# Patient Record
Sex: MALE | HISPANIC OR LATINO | Employment: PART TIME | URBAN - METROPOLITAN AREA
[De-identification: names, ages, dates, MRNs, and addresses within clinical notes are randomized per-mention and may not be internally consistent; named-entity substitution may affect disease eponyms.]

---

## 2018-08-01 ENCOUNTER — OFFICE VISIT (OUTPATIENT)
Dept: CARDIOLOGY | Facility: MEDICAL CENTER | Age: 68
End: 2018-08-01
Payer: MEDICARE

## 2018-08-01 VITALS
OXYGEN SATURATION: 96 % | SYSTOLIC BLOOD PRESSURE: 108 MMHG | WEIGHT: 175 LBS | BODY MASS INDEX: 29.16 KG/M2 | HEIGHT: 65 IN | HEART RATE: 64 BPM | DIASTOLIC BLOOD PRESSURE: 62 MMHG

## 2018-08-01 DIAGNOSIS — I25.84 CORONARY ARTERY CALCIFICATION OF NATIVE ARTERY: ICD-10-CM

## 2018-08-01 DIAGNOSIS — I25.10 CORONARY ARTERY CALCIFICATION OF NATIVE ARTERY: ICD-10-CM

## 2018-08-01 DIAGNOSIS — E78.01 FAMILIAL HYPERCHOLESTEROLEMIA: ICD-10-CM

## 2018-08-01 PROCEDURE — 99214 OFFICE O/P EST MOD 30 MIN: CPT | Performed by: INTERNAL MEDICINE

## 2018-08-01 RX ORDER — LOVASTATIN 40 MG/1
40 TABLET ORAL NIGHTLY
COMMUNITY
End: 2024-02-13

## 2018-08-01 RX ORDER — CHLORAL HYDRATE 500 MG
1000 CAPSULE ORAL
COMMUNITY

## 2018-08-01 RX ORDER — LEVOTHYROXINE SODIUM 75 MCG
TABLET ORAL
COMMUNITY
Start: 2018-07-16

## 2018-08-01 RX ORDER — LORATADINE 10 MG/1
10 TABLET ORAL DAILY
COMMUNITY

## 2018-08-01 ASSESSMENT — ENCOUNTER SYMPTOMS
PND: 0
MYALGIAS: 0
DIZZINESS: 0
PALPITATIONS: 0
ABDOMINAL PAIN: 0
DEPRESSION: 0
INSOMNIA: 0
HEARTBURN: 0
NERVOUS/ANXIOUS: 0
LOSS OF CONSCIOUSNESS: 0
BRUISES/BLEEDS EASILY: 0
WEIGHT LOSS: 0
COUGH: 0
WHEEZING: 0
BACK PAIN: 0
SHORTNESS OF BREATH: 0
NAUSEA: 0
EYES NEGATIVE: 1

## 2018-08-01 NOTE — PROGRESS NOTES
"Chief Complaint   Patient presents with   • Hyperlipidemia       Subjective:   Nestor Borja Jr. is a 67 y.o. male who presents today in follow-up in regards to his coronary calcification/CAD diagnosed several years ago by Dr. Jacobs as well as hyperlipidemia and very strong family history of coronary disease at a young age and his father    Reestablishing care, friend diagnosed with coronary disease and had urgent CABG.  Worries about this with his upcoming 68th birthday  Less and less walking and golf because of knee arthritis, wonders if he still has exercise capacity or heart disease    Wife getting treated for lung cancer, weekly visits to get Solomon  Medications as directed no labs since 2 years ago when he had HCV treatment    Past Medical History:   Diagnosis Date   • Anesthesia     'sensitive'   • Arthritis     in his  knees   • Cold     11/28   • Diabetes (HCC)     \"borderline\"   • Hepatitis C    • High cholesterol    • Renal disorder 1972    left [id number]   • Unspecified disorder of thyroid     takes med     Past Surgical History:   Procedure Laterality Date   • KNEE ARTHROPLASTY TOTAL Left 12/14/2015    Procedure: KNEE ARTHROPLASTY TOTAL & STAPLE REMOVAL;  Surgeon: Rose Mary Mclaughlin M.D.;  Location: SURGERY [facility];  Service:    • KNEE ARTHROPLASTY TOTAL  1/11/2011    Performed by ROSE MARY MCLAUGHLIN at SURGERY [facility]   • SEPTOTURBINOPLASTY  1/14/2010    Performed by MARLENE ROMAN at SURGERY SAME DAY [facility]S   • CLARKE BY LAPAROSCOPY  10/22/2009    Performed by LUBA APARICIO at SURGERY SAME DAY [facility] ORS   • OTHER  09/2009     liver biopsy   • NEPHRECTOMY RADICAL  1972    left   • OTHER ORTHOPEDIC SURGERY  [id number]    2 left knee and 3 right knee     Family History   Problem Relation Age of Onset   • Stroke Unknown    • Cancer Unknown      Social History     Social History   • Marital status:      Spouse name: N/A   • Number of children: N/A   • Years " of education: N/A     Occupational History   • Not on file.     Social History Main Topics   • Smoking status: Never Smoker   • Smokeless tobacco: Never Used   • Alcohol use No   • Drug use: No   • Sexual activity: Not on file     Other Topics Concern   • Not on file     Social History Narrative   • No narrative on file     No Known Allergies  Outpatient Encounter Prescriptions as of 8/1/2018   Medication Sig Dispense Refill   • lovastatin (MEVACOR) 40 MG tablet Take 40 mg by mouth every evening.     • SYNTHROID 75 MCG Tab      • loratadine (CLARITIN) 10 MG Tab Take 10 mg by mouth every day.     • Omega-3 Fatty Acids (FISH OIL) 1000 MG Cap capsule Take 1,000 mg by mouth 3 times a day, with meals.     • Cholecalciferol (VITAMIN D PO) Take 2,000 Units by mouth.     • aspirin (ASA) 81 MG Chew Tab chewable tablet Take 81 mg by mouth every day.     • [DISCONTINUED] metformin (GLUCOPHAGE) 500 MG Tab Take 500 mg by mouth 2 times a day, with meals.     • [DISCONTINUED] lovastatin (MEVACOR) 20 MG Tab Take 20 mg by mouth every evening.     • [DISCONTINUED] multivitamin (THERAGRAN) Tab Take 1 Tab by mouth every day.     • [DISCONTINUED] Ascorbic Acid (VITAMIN C) 1000 MG Tab Take  by mouth.     • [DISCONTINUED] LEVOTHYROXINE SODIUM Take 0.05 mg by mouth every day.       No facility-administered encounter medications on file as of 8/1/2018.      Review of Systems   Constitutional: Negative for malaise/fatigue and weight loss.   HENT: Negative for congestion and hearing loss.    Eyes: Negative.    Respiratory: Negative for cough, shortness of breath and wheezing.    Cardiovascular: Negative for chest pain, palpitations, leg swelling and PND.   Gastrointestinal: Negative for abdominal pain, heartburn and nausea.   Musculoskeletal: Negative for back pain and myalgias.   Skin: Negative for rash.   Neurological: Negative for dizziness and loss of consciousness.   Endo/Heme/Allergies: Does not bruise/bleed easily.  "  Psychiatric/Behavioral: Negative for depression. The patient is not nervous/anxious and does not have insomnia.    All other systems reviewed and are negative.       Objective:   /62   Pulse 64   Ht 1.651 m (5' 5\")   Wt 79.4 kg (175 lb)   SpO2 96%   BMI 29.12 kg/m²     Physical Exam   Constitutional: He is oriented to person, place, and time.   Well-appearing healthy   HENT:   Head: Normocephalic and atraumatic.   Eyes: Pupils are equal, round, and reactive to light. No scleral icterus.   Neck: No JVD present. No thyromegaly present.   Cardiovascular: Normal rate and regular rhythm.  Exam reveals no friction rub.    No murmur heard.  No bruits normal carotid upstrokes normal dorsalis pedis pulses bilaterally and radial pulses   Pulmonary/Chest: Breath sounds normal. No respiratory distress. He exhibits no tenderness.   Abdominal: Bowel sounds are normal. He exhibits no distension.   Musculoskeletal: He exhibits no edema or tenderness.   Lymphadenopathy:     He has no cervical adenopathy.   Neurological: He is alert and oriented to person, place, and time. He exhibits normal muscle tone. Coordination normal.   Skin: Skin is warm and dry.   Psychiatric: He has a normal mood and affect. His behavior is normal.       Assessment:     1. Familial hypercholesterolemia     2. Coronary artery calcification of native artery  Echo-Rest/Stress w/o Contrast    LIPID PROFILE    COMP METABOLIC PANEL       Medical Decision Making:  Today's Assessment / Status / Plan:       Entire chart reviewed we reviewed his last labs which are in 2015    Coronary calcification/family history coronary disease  Reduced exercise capacity  Maximal stress echo discussed and ordered.  Talked about the possibility of needing an angiogram if significant abnormalities.  Continue baby aspirin and statin    Hyperlipidemia  As above, definitely needs blood work done including lipids and LFTs  Talked about 10 year risks and goals    Spent more " than 35 minutes discussing this  RTC based on testing or in 1 year

## 2018-08-21 ENCOUNTER — HOSPITAL ENCOUNTER (OUTPATIENT)
Dept: CARDIOLOGY | Facility: MEDICAL CENTER | Age: 68
End: 2018-08-21
Attending: INTERNAL MEDICINE
Payer: MEDICARE

## 2018-08-21 DIAGNOSIS — I25.84 CORONARY ARTERY CALCIFICATION OF NATIVE ARTERY: ICD-10-CM

## 2018-08-21 DIAGNOSIS — I25.10 CORONARY ARTERY CALCIFICATION OF NATIVE ARTERY: ICD-10-CM

## 2018-08-21 LAB — LV EJECT FRACT  99904: 70

## 2018-08-21 PROCEDURE — 93350 STRESS TTE ONLY: CPT

## 2018-08-21 PROCEDURE — 93017 CV STRESS TEST TRACING ONLY: CPT

## 2018-08-21 PROCEDURE — 93350 STRESS TTE ONLY: CPT | Mod: 26 | Performed by: INTERNAL MEDICINE

## 2018-08-21 PROCEDURE — 93018 CV STRESS TEST I&R ONLY: CPT | Performed by: INTERNAL MEDICINE

## 2018-08-22 ENCOUNTER — TELEPHONE (OUTPATIENT)
Dept: CARDIOLOGY | Facility: MEDICAL CENTER | Age: 68
End: 2018-08-22

## 2018-08-22 NOTE — TELEPHONE ENCOUNTER
Notes recorded by Fariba Cortes M.D. on 8/21/2018 at 9:15 PM PDT  Great news - normal and strong heart on stress  F/u as planned  ===================================================  Letter sent to pt with above echo results.

## 2018-08-22 NOTE — LETTER
August 22, 2018        Nestor Borja Jr.  [address] [address]          Dear Nestor,    We have received the results of your recent:    Stress echocardiogram     Your test came back within normal limits. Dr. Cortes says your heart is normal and strong.  Please follow up as previously discussed with your physician.      Feel free to call us with any questions.        Sincerely,          SERVANDO Araujo.

## 2019-07-05 ENCOUNTER — OFFICE VISIT (OUTPATIENT)
Dept: URGENT CARE | Facility: MEDICAL CENTER | Age: 69
End: 2019-07-05
Payer: MEDICARE

## 2019-07-05 VITALS
DIASTOLIC BLOOD PRESSURE: 74 MMHG | HEART RATE: 62 BPM | SYSTOLIC BLOOD PRESSURE: 166 MMHG | BODY MASS INDEX: 29.92 KG/M2 | HEIGHT: 65 IN | TEMPERATURE: 97.1 F | OXYGEN SATURATION: 93 % | WEIGHT: 179.6 LBS

## 2019-07-05 DIAGNOSIS — L03.113 RIGHT ARM CELLULITIS: ICD-10-CM

## 2019-07-05 DIAGNOSIS — M70.21 OLECRANON BURSITIS, RIGHT ELBOW: ICD-10-CM

## 2019-07-05 PROCEDURE — 99204 OFFICE O/P NEW MOD 45 MIN: CPT | Performed by: PHYSICIAN ASSISTANT

## 2019-07-05 RX ORDER — CEPHALEXIN 500 MG/1
500 CAPSULE ORAL 4 TIMES DAILY
Qty: 28 CAP | Refills: 0 | Status: SHIPPED | OUTPATIENT
Start: 2019-07-05 | End: 2019-07-05 | Stop reason: SDUPTHER

## 2019-07-05 RX ORDER — CEPHALEXIN 500 MG/1
500 CAPSULE ORAL 4 TIMES DAILY
Qty: 28 CAP | Refills: 0 | Status: SHIPPED | OUTPATIENT
Start: 2019-07-05 | End: 2019-07-12

## 2019-07-05 RX ORDER — SULFAMETHOXAZOLE AND TRIMETHOPRIM 800; 160 MG/1; MG/1
1 TABLET ORAL 2 TIMES DAILY
Qty: 14 TAB | Refills: 0 | Status: SHIPPED | OUTPATIENT
Start: 2019-07-05 | End: 2019-07-12

## 2019-07-05 ASSESSMENT — ENCOUNTER SYMPTOMS
FOCAL WEAKNESS: 0
FEVER: 0
CHILLS: 0
NAUSEA: 0
ABDOMINAL PAIN: 0
VOMITING: 0
ROS SKIN COMMENTS: RIGHT ARM SWELLING
PALPITATIONS: 0
COUGH: 0
SENSORY CHANGE: 0
SHORTNESS OF BREATH: 0
TINGLING: 0

## 2019-07-05 NOTE — PROGRESS NOTES
"Subjective:      Nestor Borja Jr. is a 68 y.o. male who presents with Arm Injury (redness and seliing on rtside from elbow to bottom of arm, no pain Xtoday)            Patient is here with complaints of right arm swelling x a few days. He states his swelling started several days ago in his right elbow area. He denies any injury. He reports a fluid filled sac on his right elbow. He reports over the past 2 days the swelling has extended down his right arm. He denies pain but reports warmth and redness. He denies a recent bug bite or skin injury. He has had no fever or chills. He denies recent venipuncture.      Past Medical History:   Diagnosis Date   • Anesthesia     'sensitive'   • Arthritis     in his  knees   • Cold     11/28   • Diabetes (HCC)     \"borderline\"   • Hepatitis C    • High cholesterol    • Renal disorder 1972    left [id number]   • Unspecified disorder of thyroid     takes med       Past Surgical History:   Procedure Laterality Date   • KNEE ARTHROPLASTY TOTAL Left 12/14/2015    Procedure: KNEE ARTHROPLASTY TOTAL & STAPLE REMOVAL;  Surgeon: Rose Mary Mclaughlin M.D.;  Location: SURGERY [facility];  Service:    • KNEE ARTHROPLASTY TOTAL  1/11/2011    Performed by ROSE MARY MCLAUGHLIN at SURGERY [facility]   • SEPTOTURBINOPLASTY  1/14/2010    Performed by MARLENE ROMAN at SURGERY SAME DAY [facility]   • CLARKE BY LAPAROSCOPY  10/22/2009    Performed by LUBA APARICIO at SURGERY SAME DAY [facility]   • OTHER  09/2009     liver biopsy   • NEPHRECTOMY RADICAL  1972    left   • OTHER ORTHOPEDIC SURGERY  [id number]    2 left knee and 3 right knee       Family History   Problem Relation Age of Onset   • Stroke Unknown    • Cancer Unknown        No Known Allergies     Review of Systems   Constitutional: Negative for chills, fever and malaise/fatigue.   Respiratory: Negative for cough and shortness of breath.    Cardiovascular: Negative for chest pain, palpitations and leg swelling.   " Is This A New Presentation, Or A Follow-Up?: Skin Lesion "  Gastrointestinal: Negative for abdominal pain, nausea and vomiting.   Musculoskeletal: Negative for joint pain.   Skin: Negative for itching and rash.        Right arm swelling   Neurological: Negative for tingling, sensory change and focal weakness.       All other systems reviewed and are negative.        Objective:     BP (!) 166/74   Pulse 62   Temp 36.2 °C (97.1 °F)   Ht 1.651 m (5' 5\")   Wt 81.5 kg (179 lb 9.6 oz)   SpO2 93%   BMI 29.89 kg/m²      Physical Exam   Constitutional: He is oriented to person, place, and time. He appears well-developed and well-nourished. No distress.   HENT:   Head: Normocephalic and atraumatic.   Eyes: Conjunctivae are normal.   Cardiovascular: Normal rate, regular rhythm and normal heart sounds.  Exam reveals no gallop and no friction rub.    No murmur heard.  Pulmonary/Chest: Effort normal. No respiratory distress. He has no wheezes. He has no rales.   Musculoskeletal:        Right elbow: He exhibits swelling (olecranon bursa with swelling and erythema. Warm). He exhibits normal range of motion and no deformity. No tenderness found.        Arms:  Neurological: He is alert and oriented to person, place, and time.   Psychiatric: He has a normal mood and affect. His behavior is normal. Judgment and thought content normal.               Assessment/Plan:     1. Right arm cellulitis    2. Olecranon bursitis, right elbow    - sulfamethoxazole-trimethoprim (BACTRIM DS) 800-160 MG tablet; Take 1 Tab by mouth 2 times a day for 7 days.  Dispense: 14 Tab; Refill: 0  - cephALEXin (KEFLEX) 500 MG Cap; Take 1 Cap by mouth 4 times a day for 7 days.  Dispense: 28 Cap; Refill: 0    Encouraged ICE, OTC NSAIDS  Probiotics with antibiotics.   If fever, chills, or worsening redness- go to ED for re-evaluation.     Differential diagnoses, Supportive care, and indications for immediate follow-up discussed with patient.   Instructed to return to clinic or nearest emergency department for any " How Severe Is Your Skin Lesion?: mild Has Your Skin Lesion Been Treated?: not been treated change in condition, further concerns, or worsening of symptoms.      The patient demonstrated a good understanding and agreed with the treatment plan.    Libra Bradford P.A.-C.

## 2020-08-07 ENCOUNTER — TELEPHONE (OUTPATIENT)
Dept: CARDIOLOGY | Facility: MEDICAL CENTER | Age: 70
End: 2020-08-07

## 2020-08-07 DIAGNOSIS — I25.10 CORONARY ARTERY CALCIFICATION OF NATIVE ARTERY: ICD-10-CM

## 2020-08-07 DIAGNOSIS — I25.84 CORONARY ARTERY CALCIFICATION OF NATIVE ARTERY: ICD-10-CM

## 2020-08-07 DIAGNOSIS — E78.01 FAMILIAL HYPERCHOLESTEROLEMIA: ICD-10-CM

## 2021-01-15 DIAGNOSIS — Z23 NEED FOR VACCINATION: ICD-10-CM

## 2022-12-28 ENCOUNTER — OFFICE VISIT (OUTPATIENT)
Dept: URGENT CARE | Facility: CLINIC | Age: 72
End: 2022-12-28
Payer: MEDICARE

## 2022-12-28 VITALS
TEMPERATURE: 97.2 F | RESPIRATION RATE: 18 BRPM | OXYGEN SATURATION: 97 % | HEIGHT: 65 IN | WEIGHT: 175 LBS | SYSTOLIC BLOOD PRESSURE: 120 MMHG | DIASTOLIC BLOOD PRESSURE: 60 MMHG | BODY MASS INDEX: 29.16 KG/M2 | HEART RATE: 68 BPM

## 2022-12-28 DIAGNOSIS — J02.0 PHARYNGITIS DUE TO STREPTOCOCCUS SPECIES: ICD-10-CM

## 2022-12-28 LAB
INT CON NEG: ABNORMAL
INT CON POS: ABNORMAL
S PYO AG THROAT QL: POSITIVE

## 2022-12-28 PROCEDURE — 99203 OFFICE O/P NEW LOW 30 MIN: CPT | Performed by: NURSE PRACTITIONER

## 2022-12-28 PROCEDURE — 87880 STREP A ASSAY W/OPTIC: CPT | Performed by: NURSE PRACTITIONER

## 2022-12-28 RX ORDER — AMOXICILLIN 500 MG/1
500 CAPSULE ORAL 2 TIMES DAILY
Qty: 20 CAPSULE | Refills: 0 | Status: SHIPPED | OUTPATIENT
Start: 2022-12-28 | End: 2023-01-07

## 2022-12-28 NOTE — PROGRESS NOTES
"Patient has consented to treatment and for use of patient information for treatment and billing purposes.    Date: 12/28/22     Arrival Mode: Private Vehicle / Ambulatory    Chief Complaint:    Chief Complaint   Patient presents with    Sore Throat     Since 12/26        History of Present Illness: 72 y.o. male  presents to clinic with 2-day history of sore throat.  Patient has also had increased fatigue.  Very mild nasal congestion.  Denies cough nausea vomiting diarrhea.  No shortness of breath chest pain or leg swelling.      ROS:  As stated in HPI     Pertinent Medical History:    Past Medical History:   Diagnosis Date    Anesthesia     'sensitive'    Arthritis     in his  knees    Cold     11/28    Diabetes (HCC)     \"borderline\"    Hepatitis C     High cholesterol     Renal disorder 1972    left [id number]    Unspecified disorder of thyroid     takes med        Pertinent Surgical History:    Past Surgical History:   Procedure Laterality Date    KNEE ARTHROPLASTY TOTAL Left 12/14/2015    Procedure: KNEE ARTHROPLASTY TOTAL & STAPLE REMOVAL;  Surgeon: Rose Mary Mclaughlin M.D.;  Location: SURGERY [facility];  Service:     KNEE ARTHROPLASTY TOTAL  1/11/2011    Performed by ROSE MARY MCLAUGHLIN at SURGERY [facility]    SEPTOTURBINOPLASTY  1/14/2010    Performed by MARLENE ROMAN at SURGERY SAME DAY [facility]    CLARKE BY LAPAROSCOPY  10/22/2009    Performed by LUBA APARICIO at SURGERY SAME DAY [facility]S    OTHER  09/2009     liver biopsy    NEPHRECTOMY RADICAL  1972    left    OTHER ORTHOPEDIC SURGERY  [id number]    2 left knee and 3 right knee        Current  Medications:  Current Outpatient Medications on File Prior to Visit   Medication Sig Dispense Refill    lovastatin (MEVACOR) 40 MG tablet Take 40 mg by mouth every evening.      SYNTHROID 75 MCG Tab       loratadine (CLARITIN) 10 MG Tab Take 10 mg by mouth every day.      Omega-3 Fatty Acids (FISH OIL) 1000 MG Cap capsule Take 1,000 mg by mouth " 3 times a day, with meals.      Cholecalciferol (VITAMIN D PO) Take 2,000 Units by mouth.      aspirin (ASA) 81 MG Chew Tab chewable tablet Take 81 mg by mouth every day.       No current facility-administered medications on file prior to visit.        Allergies:     Patient has no known allergies.     Social History:   Social History     Socioeconomic History    Marital status:      Spouse name: Not on file    Number of children: Not on file    Years of education: Not on file    Highest education level: Not on file   Occupational History    Not on file   Tobacco Use    Smoking status: Never    Smokeless tobacco: Never   Substance and Sexual Activity    Alcohol use: No    Drug use: No    Sexual activity: Not on file   Other Topics Concern    Not on file   Social History Narrative    Not on file     Social Determinants of Health     Financial Resource Strain: Not on file   Food Insecurity: Not on file   Transportation Needs: Not on file   Physical Activity: Not on file   Stress: Not on file   Social Connections: Not on file   Intimate Partner Violence: Not on file   Housing Stability: Not on file        No LMP for male patient.       Physical Exam:  Vitals:    12/28/22 0838   BP: 120/60   Pulse: 68   Resp: 18   Temp: 36.2 °C (97.2 °F)   SpO2: 97%        Physical Exam  Constitutional:       General: He is awake.      Appearance: Normal appearance. He is not ill-appearing, toxic-appearing or diaphoretic.   HENT:      Head: Normocephalic and atraumatic.      Right Ear: Tympanic membrane, ear canal and external ear normal.      Left Ear: Tympanic membrane, ear canal and external ear normal.      Nose: Rhinorrhea present. Rhinorrhea is clear.      Mouth/Throat:      Lips: Pink.      Mouth: Mucous membranes are moist.      Tongue: No lesions.      Palate: No lesions.      Pharynx: Posterior oropharyngeal erythema present. No oropharyngeal exudate or uvula swelling.      Tonsils: Tonsillar exudate present. No  tonsillar abscesses. 3+ on the right. 2+ on the left.   Eyes:      General: Lids are normal. Gaze aligned appropriately. No allergic shiner or scleral icterus.     Extraocular Movements: Extraocular movements intact.      Conjunctiva/sclera: Conjunctivae normal.      Pupils: Pupils are equal, round, and reactive to light.   Cardiovascular:      Rate and Rhythm: Normal rate and regular rhythm.      Pulses:           Radial pulses are 2+ on the right side and 2+ on the left side.      Heart sounds: Normal heart sounds.   Pulmonary:      Effort: Pulmonary effort is normal.      Breath sounds: Normal breath sounds and air entry. No decreased breath sounds, wheezing, rhonchi or rales.   Abdominal:      General: Abdomen is flat. Bowel sounds are normal.      Palpations: Abdomen is soft.      Tenderness: There is no abdominal tenderness.   Musculoskeletal:      Right lower leg: No edema.      Left lower leg: No edema.   Lymphadenopathy:      Cervical: Cervical adenopathy present.      Right cervical: Superficial cervical adenopathy present.      Left cervical: Superficial cervical adenopathy present.   Skin:     General: Skin is warm.      Capillary Refill: Capillary refill takes less than 2 seconds.      Coloration: Skin is not cyanotic or pale.   Neurological:      Mental Status: He is alert and oriented to person, place, and time.      Gait: Gait is intact.   Psychiatric:         Behavior: Behavior normal. Behavior is cooperative.        Diagnostics:    Poct strep- positive    Medical Decision Making:  I personally reviewed prior external notes and test results pertinent to today's visit.   Shared decision-making was utilized with patient did develop treatment plan and clinic course. Jose, 72 y.o. male presenting clinic on the morning of day 3 of sore throat.  HPI and exam findings are consistent with strep pharyngitis.  Did obtain a POCT strep of which the results were positive.  Will treat with amoxicillin 10-day  course.  Did advise to finish antibiotics in their entirety even when he is feeling better.  Recommend changing toothbrush on day 3 of antibiotics.    Did advise patient on conservative measures for management of symptoms.  Patient is agreeable to pursue adequate rest, adequate hydration, saltwater gargle and Neti pot for any symptoms of upper respiratory congestion.  Over-the-counter analgesia and antipyretics on a p.r.n. basis as needed for pain and fever.  Did discuss over-the-counter cough medications.      Patient will monitor symptoms closely for worsening and is advised to seek further evaluation the emergency room if alarm signs or symptoms arise.  Patient states understanding and verbalizes agreement with this plan of care.    Plan:    1. Pharyngitis due to Streptococcus species    - amoxicillin (AMOXIL) 500 MG Cap; Take 1 Capsule by mouth 2 times a day for 10 days.  Dispense: 20 Capsule; Refill: 0           Disposition:  Patient was discharged in stable condition.    Voice Recognition Disclaimer: Portions of this document were created using voice recognition software. The software does have a chance of producing errors of grammar and possibly content. I have made every reasonable attempt to correct obvious errors, but there may be errors of grammar and possibly content that I did not discover before finalizing the documentation.    Libra Johnson, A.P.R.N.

## 2023-02-16 ENCOUNTER — HOSPITAL ENCOUNTER (OUTPATIENT)
Dept: LAB | Facility: MEDICAL CENTER | Age: 73
End: 2023-02-16
Attending: INTERNAL MEDICINE
Payer: MEDICARE

## 2023-02-16 LAB
ALBUMIN SERPL BCP-MCNC: 3.9 G/DL (ref 3.2–4.9)
ALBUMIN/GLOB SERPL: 1.2 G/DL
ALP SERPL-CCNC: 68 U/L (ref 30–99)
ALT SERPL-CCNC: 25 U/L (ref 2–50)
ANION GAP SERPL CALC-SCNC: 8 MMOL/L (ref 7–16)
AST SERPL-CCNC: 24 U/L (ref 12–45)
BILIRUB SERPL-MCNC: 0.6 MG/DL (ref 0.1–1.5)
BUN SERPL-MCNC: 21 MG/DL (ref 8–22)
CALCIUM ALBUM COR SERPL-MCNC: 9.1 MG/DL (ref 8.5–10.5)
CALCIUM SERPL-MCNC: 9 MG/DL (ref 8.4–10.2)
CHLORIDE SERPL-SCNC: 104 MMOL/L (ref 96–112)
CHOLEST SERPL-MCNC: 117 MG/DL (ref 100–199)
CO2 SERPL-SCNC: 26 MMOL/L (ref 20–33)
CREAT SERPL-MCNC: 1.26 MG/DL (ref 0.5–1.4)
ERYTHROCYTE [DISTWIDTH] IN BLOOD BY AUTOMATED COUNT: 41.2 FL (ref 35.9–50)
FASTING STATUS PATIENT QL REPORTED: NORMAL
GFR SERPLBLD CREATININE-BSD FMLA CKD-EPI: 60 ML/MIN/1.73 M 2
GLOBULIN SER CALC-MCNC: 3.3 G/DL (ref 1.9–3.5)
GLUCOSE SERPL-MCNC: 109 MG/DL (ref 65–99)
HCT VFR BLD AUTO: 45.5 % (ref 42–52)
HDLC SERPL-MCNC: 33 MG/DL
HGB BLD-MCNC: 15.6 G/DL (ref 14–18)
LDLC SERPL CALC-MCNC: 53 MG/DL
MCH RBC QN AUTO: 32 PG (ref 27–33)
MCHC RBC AUTO-ENTMCNC: 34.3 G/DL (ref 33.7–35.3)
MCV RBC AUTO: 93.4 FL (ref 81.4–97.8)
PLATELET # BLD AUTO: 197 K/UL (ref 164–446)
PMV BLD AUTO: 9.5 FL (ref 9–12.9)
POTASSIUM SERPL-SCNC: 4.3 MMOL/L (ref 3.6–5.5)
PROT SERPL-MCNC: 7.2 G/DL (ref 6–8.2)
RBC # BLD AUTO: 4.87 M/UL (ref 4.7–6.1)
SODIUM SERPL-SCNC: 138 MMOL/L (ref 135–145)
TRIGL SERPL-MCNC: 153 MG/DL (ref 0–149)
WBC # BLD AUTO: 6.2 K/UL (ref 4.8–10.8)

## 2023-02-16 PROCEDURE — 85027 COMPLETE CBC AUTOMATED: CPT

## 2023-02-16 PROCEDURE — 80053 COMPREHEN METABOLIC PANEL: CPT

## 2023-02-16 PROCEDURE — 80061 LIPID PANEL: CPT

## 2023-02-16 PROCEDURE — 36415 COLL VENOUS BLD VENIPUNCTURE: CPT

## 2023-11-29 ENCOUNTER — PATIENT MESSAGE (OUTPATIENT)
Dept: HEALTH INFORMATION MANAGEMENT | Facility: OTHER | Age: 73
End: 2023-11-29

## 2023-12-07 ENCOUNTER — APPOINTMENT (RX ONLY)
Dept: URBAN - METROPOLITAN AREA CLINIC 4 | Facility: CLINIC | Age: 73
Setting detail: DERMATOLOGY
End: 2023-12-07

## 2023-12-07 DIAGNOSIS — D18.0 HEMANGIOMA: ICD-10-CM

## 2023-12-07 DIAGNOSIS — D22 MELANOCYTIC NEVI: ICD-10-CM

## 2023-12-07 DIAGNOSIS — L81.4 OTHER MELANIN HYPERPIGMENTATION: ICD-10-CM

## 2023-12-07 DIAGNOSIS — Z71.89 OTHER SPECIFIED COUNSELING: ICD-10-CM

## 2023-12-07 DIAGNOSIS — L82.1 OTHER SEBORRHEIC KERATOSIS: ICD-10-CM

## 2023-12-07 PROBLEM — D18.01 HEMANGIOMA OF SKIN AND SUBCUTANEOUS TISSUE: Status: ACTIVE | Noted: 2023-12-07

## 2023-12-07 PROBLEM — D22.5 MELANOCYTIC NEVI OF TRUNK: Status: ACTIVE | Noted: 2023-12-07

## 2023-12-07 PROCEDURE — ? COUNSELING

## 2023-12-07 PROCEDURE — 99203 OFFICE O/P NEW LOW 30 MIN: CPT

## 2023-12-07 PROCEDURE — ? SUNSCREEN RECOMMENDATIONS

## 2023-12-07 ASSESSMENT — LOCATION DETAILED DESCRIPTION DERM
LOCATION DETAILED: INFERIOR THORACIC SPINE
LOCATION DETAILED: LEFT POSTERIOR SHOULDER
LOCATION DETAILED: SUPERIOR LUMBAR SPINE
LOCATION DETAILED: EPIGASTRIC SKIN
LOCATION DETAILED: RIGHT POSTERIOR SHOULDER

## 2023-12-07 ASSESSMENT — LOCATION SIMPLE DESCRIPTION DERM
LOCATION SIMPLE: RIGHT SHOULDER
LOCATION SIMPLE: LOWER BACK
LOCATION SIMPLE: UPPER BACK
LOCATION SIMPLE: ABDOMEN
LOCATION SIMPLE: LEFT SHOULDER

## 2023-12-07 ASSESSMENT — LOCATION ZONE DERM
LOCATION ZONE: TRUNK
LOCATION ZONE: ARM

## 2023-12-07 NOTE — HPI: FULL BODY SKIN EXAMINATION
How Severe Are Your Spot(S)?: mild
What Type Of Note Output Would You Prefer (Optional)?: Standard Output
What Is The Reason For Today's Visit?: Full Body Skin Examination
What Is The Reason For Today's Visit? (Being Monitored For X): concerning skin lesions on an annual basis
Additional History: Pt has not noticed any new or changing moles. No tender or bleeding spots.
No

## 2024-02-13 ENCOUNTER — OFFICE VISIT (OUTPATIENT)
Dept: CARDIOLOGY | Facility: MEDICAL CENTER | Age: 74
End: 2024-02-13
Attending: INTERNAL MEDICINE
Payer: MEDICARE

## 2024-02-13 VITALS
WEIGHT: 174.8 LBS | BODY MASS INDEX: 29.12 KG/M2 | HEART RATE: 68 BPM | HEIGHT: 65 IN | DIASTOLIC BLOOD PRESSURE: 58 MMHG | RESPIRATION RATE: 14 BRPM | OXYGEN SATURATION: 94 % | SYSTOLIC BLOOD PRESSURE: 116 MMHG

## 2024-02-13 DIAGNOSIS — R01.1 UNDIAGNOSED CARDIAC MURMURS: ICD-10-CM

## 2024-02-13 DIAGNOSIS — E78.01 FAMILIAL HYPERCHOLESTEROLEMIA: ICD-10-CM

## 2024-02-13 PROCEDURE — 3078F DIAST BP <80 MM HG: CPT | Performed by: INTERNAL MEDICINE

## 2024-02-13 PROCEDURE — 99212 OFFICE O/P EST SF 10 MIN: CPT | Performed by: INTERNAL MEDICINE

## 2024-02-13 PROCEDURE — 3074F SYST BP LT 130 MM HG: CPT | Performed by: INTERNAL MEDICINE

## 2024-02-13 PROCEDURE — 99204 OFFICE O/P NEW MOD 45 MIN: CPT | Performed by: INTERNAL MEDICINE

## 2024-02-13 PROCEDURE — 93005 ELECTROCARDIOGRAM TRACING: CPT | Performed by: INTERNAL MEDICINE

## 2024-02-13 RX ORDER — ROSUVASTATIN CALCIUM 20 MG/1
20 TABLET, COATED ORAL DAILY
Qty: 100 TABLET | Refills: 4 | Status: SHIPPED | OUTPATIENT
Start: 2024-02-13

## 2024-02-13 RX ORDER — CARBOXYMETHYLCELLULOSE SODIUM 5 MG/ML
SOLUTION/ DROPS OPHTHALMIC
COMMUNITY
Start: 2023-10-26 | End: 2024-10-24

## 2024-02-13 RX ORDER — ROSUVASTATIN CALCIUM 20 MG/1
TABLET, COATED ORAL
COMMUNITY
End: 2024-02-13 | Stop reason: SDUPTHER

## 2024-02-13 ASSESSMENT — ENCOUNTER SYMPTOMS
COUGH: 0
HEADACHES: 0
DEPRESSION: 0
PALPITATIONS: 0
DIZZINESS: 0
HALLUCINATIONS: 0
SPEECH CHANGE: 0
ORTHOPNEA: 0
ABDOMINAL PAIN: 0
SHORTNESS OF BREATH: 0
WEIGHT LOSS: 0
EYE DISCHARGE: 0
PND: 0
BLOOD IN STOOL: 0
MYALGIAS: 0
DOUBLE VISION: 0
BLURRED VISION: 0
CHILLS: 0
SENSORY CHANGE: 0
VOMITING: 0
FALLS: 0
FEVER: 0
NAUSEA: 0
LOSS OF CONSCIOUSNESS: 0
EYE PAIN: 0
BRUISES/BLEEDS EASILY: 0
CLAUDICATION: 0

## 2024-02-13 ASSESSMENT — FIBROSIS 4 INDEX: FIB4 SCORE: 4.21

## 2024-02-14 LAB — EKG IMPRESSION: NORMAL

## 2024-02-14 PROCEDURE — 93010 ELECTROCARDIOGRAM REPORT: CPT | Performed by: INTERNAL MEDICINE

## 2024-02-14 NOTE — PROGRESS NOTES
"Chief Complaint   Patient presents with    New Patient     Est Care       Subjective     Nestor Borja Jr. is a 73 y.o. male who presents today for management of hyperlipidemia.    I have personally interpreted EKG today with patient, there is no evidence of acute coronary syndrome, no evidence of prior infarct, normal NM and QT interval, no significant conduction disease. Sinus rhythm.    Strong family hx of heart disease on his father's side.    I have independently interpreted and reviewed blood tests results with patient in clinic which shows LDL level of 53, triglycerides level of 153, GFR of 60, K of 5.2.      Past Medical History:   Diagnosis Date    Anesthesia     'sensitive'    Arthritis     in his  knees    Cold     11/28    Diabetes (HCC)     \"borderline\"    Hepatitis C     High cholesterol     Renal disorder 1972    left [id number]    Unspecified disorder of thyroid     takes med     Past Surgical History:   Procedure Laterality Date    KNEE ARTHROPLASTY TOTAL Left 12/14/2015    Procedure: KNEE ARTHROPLASTY TOTAL & STAPLE REMOVAL;  Surgeon: Rose Mary Mclaughlin M.D.;  Location: SURGERY [facility];  Service:     KNEE ARTHROPLASTY TOTAL  1/11/2011    Performed by ROSE MARY MCLAUGHLIN at SURGERY [facility]    SEPTOTURBINOPLASTY  1/14/2010    Performed by MARLENE ROMAN at SURGERY SAME DAY [facility]    CLARKE BY LAPAROSCOPY  10/22/2009    Performed by LUBA APARICIO at SURGERY SAME DAY [facility]    OTHER  09/2009     liver biopsy    NEPHRECTOMY RADICAL  1972    left    OTHER ORTHOPEDIC SURGERY  [id number]    2 left knee and 3 right knee     Family History   Problem Relation Age of Onset    Stroke Unknown     Cancer Unknown      Social History     Socioeconomic History    Marital status:      Spouse name: Not on file    Number of children: Not on file    Years of education: Not on file    Highest education level: Not on file   Occupational History    Not on file   Tobacco Use    " Smoking status: Never    Smokeless tobacco: Never   Substance and Sexual Activity    Alcohol use: No    Drug use: No    Sexual activity: Not on file   Other Topics Concern    Not on file   Social History Narrative    Not on file     Social Determinants of Health     Financial Resource Strain: Not on file   Food Insecurity: Not on file   Transportation Needs: Not on file   Physical Activity: Not on file   Stress: Not on file   Social Connections: Not on file   Intimate Partner Violence: Not on file   Housing Stability: Not on file     No Known Allergies  Outpatient Encounter Medications as of 2/13/2024   Medication Sig Dispense Refill    carboxymethylcellulose (REFRESH TEARS) 0.5 % Solution instill 1 drop into both eyes 4 times a day. as needed for dry eye      multivitamin (THERAPEUTIC MULTIVITAMIN) Tab Take 1 Tablet by mouth every day.      rosuvastatin (CRESTOR) 20 MG Tab Take 1 Tablet by mouth every day. 100 Tablet 4    SYNTHROID 75 MCG Tab       loratadine (CLARITIN) 10 MG Tab Take 10 mg by mouth every day.      Omega-3 Fatty Acids (FISH OIL) 1000 MG Cap capsule Take 1,000 mg by mouth 3 times a day, with meals.      Cholecalciferol (VITAMIN D PO) Take 2,000 Units by mouth.      aspirin (ASA) 81 MG Chew Tab chewable tablet Take 81 mg by mouth every day.      [DISCONTINUED] rosuvastatin (CRESTOR) 20 MG Tab       [DISCONTINUED] lovastatin (MEVACOR) 40 MG tablet Take 40 mg by mouth every evening.       No facility-administered encounter medications on file as of 2/13/2024.     Review of Systems   Constitutional:  Negative for chills, fever, malaise/fatigue and weight loss.   HENT:  Negative for ear discharge, ear pain, hearing loss and nosebleeds.    Eyes:  Negative for blurred vision, double vision, pain and discharge.   Respiratory:  Negative for cough and shortness of breath.    Cardiovascular:  Negative for chest pain, palpitations, orthopnea, claudication, leg swelling and PND.   Gastrointestinal:  Negative for  "abdominal pain, blood in stool, melena, nausea and vomiting.   Genitourinary:  Negative for dysuria and hematuria.   Musculoskeletal:  Negative for falls, joint pain and myalgias.   Skin:  Negative for itching and rash.   Neurological:  Negative for dizziness, sensory change, speech change, loss of consciousness and headaches.   Endo/Heme/Allergies:  Negative for environmental allergies. Does not bruise/bleed easily.   Psychiatric/Behavioral:  Negative for depression, hallucinations and suicidal ideas.               Objective     /58 (BP Location: Right arm, Patient Position: Sitting, BP Cuff Size: Adult)   Pulse 68   Resp 14   Ht 1.651 m (5' 5\")   Wt 79.3 kg (174 lb 12.8 oz)   SpO2 94%   BMI 29.09 kg/m²     Physical Exam  Vitals and nursing note reviewed.   Constitutional:       General: He is not in acute distress.     Appearance: He is not diaphoretic.   HENT:      Head: Normocephalic and atraumatic.      Right Ear: External ear normal.      Left Ear: External ear normal.      Nose: No congestion or rhinorrhea.   Eyes:      General:         Right eye: No discharge.         Left eye: No discharge.   Neck:      Thyroid: No thyromegaly.      Vascular: No JVD.   Cardiovascular:      Rate and Rhythm: Normal rate and regular rhythm.      Pulses: Normal pulses.   Pulmonary:      Effort: No respiratory distress.   Abdominal:      General: There is no distension.      Tenderness: There is no abdominal tenderness.   Musculoskeletal:         General: No swelling or tenderness.      Right lower leg: No edema.      Left lower leg: No edema.   Skin:     General: Skin is warm and dry.   Neurological:      Mental Status: He is alert and oriented to person, place, and time.      Cranial Nerves: No cranial nerve deficit.   Psychiatric:         Behavior: Behavior normal.                Assessment & Plan     1. Familial hypercholesterolemia  EKG    rosuvastatin (CRESTOR) 20 MG Tab      2. Undiagnosed cardiac murmurs  " EC-ECHOCARDIOGRAM COMPLETE W/O CONT          Medical Decision Making: Today's Assessment/Status/Plan:   At this time patient is clinically stable in terms of his cardiac standpoint.    Will continue Rosuvastatin 20 mg daily for LDL control.    I will order transthoracic echocardiogram to assess for structural abnormalities.    Gordon Davila M.D.

## 2024-02-23 ENCOUNTER — HOSPITAL ENCOUNTER (OUTPATIENT)
Dept: LAB | Facility: MEDICAL CENTER | Age: 74
End: 2024-02-23
Attending: INTERNAL MEDICINE
Payer: MEDICARE

## 2024-02-23 DIAGNOSIS — E78.01 FAMILIAL HYPERCHOLESTEROLEMIA: ICD-10-CM

## 2024-02-23 LAB
ANION GAP SERPL CALC-SCNC: 10 MMOL/L (ref 7–16)
BUN SERPL-MCNC: 22 MG/DL (ref 8–22)
CALCIUM SERPL-MCNC: 9.9 MG/DL (ref 8.4–10.2)
CHLORIDE SERPL-SCNC: 103 MMOL/L (ref 96–112)
CHOLEST SERPL-MCNC: 153 MG/DL (ref 100–199)
CO2 SERPL-SCNC: 27 MMOL/L (ref 20–33)
CREAT SERPL-MCNC: 1.35 MG/DL (ref 0.5–1.4)
FASTING STATUS PATIENT QL REPORTED: NORMAL
GFR SERPLBLD CREATININE-BSD FMLA CKD-EPI: 55 ML/MIN/1.73 M 2
GLUCOSE SERPL-MCNC: 112 MG/DL (ref 65–99)
HDLC SERPL-MCNC: 36 MG/DL
LDLC SERPL CALC-MCNC: 89 MG/DL
POTASSIUM SERPL-SCNC: 5 MMOL/L (ref 3.6–5.5)
SODIUM SERPL-SCNC: 140 MMOL/L (ref 135–145)
TRIGL SERPL-MCNC: 139 MG/DL (ref 0–149)

## 2024-02-23 PROCEDURE — 36415 COLL VENOUS BLD VENIPUNCTURE: CPT

## 2024-02-23 PROCEDURE — 80061 LIPID PANEL: CPT

## 2024-02-23 PROCEDURE — 83695 ASSAY OF LIPOPROTEIN(A): CPT

## 2024-02-23 PROCEDURE — 80048 BASIC METABOLIC PNL TOTAL CA: CPT

## 2024-02-24 LAB — LPA SERPL-MCNC: 55 MG/DL

## 2024-04-10 ENCOUNTER — HOSPITAL ENCOUNTER (OUTPATIENT)
Dept: CARDIOLOGY | Facility: MEDICAL CENTER | Age: 74
End: 2024-04-10
Attending: INTERNAL MEDICINE
Payer: MEDICARE

## 2024-04-10 DIAGNOSIS — R01.1 UNDIAGNOSED CARDIAC MURMURS: ICD-10-CM

## 2024-04-10 LAB — LV EJECT FRACT  99904: 65

## 2024-04-10 PROCEDURE — 93306 TTE W/DOPPLER COMPLETE: CPT | Mod: 26 | Performed by: INTERNAL MEDICINE

## 2024-04-10 PROCEDURE — 93306 TTE W/DOPPLER COMPLETE: CPT

## 2024-04-15 ENCOUNTER — APPOINTMENT (OUTPATIENT)
Dept: RADIOLOGY | Facility: IMAGING CENTER | Age: 74
End: 2024-04-15
Attending: NURSE PRACTITIONER
Payer: MEDICARE

## 2024-04-15 ENCOUNTER — OFFICE VISIT (OUTPATIENT)
Dept: URGENT CARE | Facility: CLINIC | Age: 74
End: 2024-04-15
Payer: MEDICARE

## 2024-04-15 VITALS
OXYGEN SATURATION: 93 % | HEIGHT: 65 IN | HEART RATE: 63 BPM | BODY MASS INDEX: 28.66 KG/M2 | DIASTOLIC BLOOD PRESSURE: 68 MMHG | RESPIRATION RATE: 14 BRPM | TEMPERATURE: 97.9 F | SYSTOLIC BLOOD PRESSURE: 110 MMHG | WEIGHT: 172 LBS

## 2024-04-15 DIAGNOSIS — R05.1 ACUTE COUGH: ICD-10-CM

## 2024-04-15 DIAGNOSIS — R06.02 SOB (SHORTNESS OF BREATH): ICD-10-CM

## 2024-04-15 DIAGNOSIS — R07.89 RIGHT-SIDED CHEST WALL PAIN: Primary | ICD-10-CM

## 2024-04-15 DIAGNOSIS — R07.81 RIB PAIN ON RIGHT SIDE: ICD-10-CM

## 2024-04-15 PROCEDURE — 3074F SYST BP LT 130 MM HG: CPT | Performed by: NURSE PRACTITIONER

## 2024-04-15 PROCEDURE — 71101 X-RAY EXAM UNILAT RIBS/CHEST: CPT | Mod: TC,FY,RT | Performed by: NURSE PRACTITIONER

## 2024-04-15 PROCEDURE — 3078F DIAST BP <80 MM HG: CPT | Performed by: NURSE PRACTITIONER

## 2024-04-15 PROCEDURE — 99214 OFFICE O/P EST MOD 30 MIN: CPT | Performed by: NURSE PRACTITIONER

## 2024-04-15 ASSESSMENT — FIBROSIS 4 INDEX: FIB4 SCORE: 4.21

## 2024-04-15 NOTE — PROGRESS NOTES
"Nestor Borja Jr. is a 73 y.o. male who presents for Cough (X 3-4 days, patient explains when coughing hurts chest, explains upper chest bone is painful to the touch on rt side )      HPI  This is a new problem. Nestor Borja Jr. is a 73 y.o. patient who presents to urgent care with c/o: Coughing for 3 to 4 days.  He feels like his rib is out of place on the anterior right side.  He feels a small bump.  His very painful when he coughs or sneezes. No pain at rest. Rubbing the area helps some.  This is causing him worrying concern which has now prompted an urgent care visit.  He denies SOB, CP, palpitations, diaphoresis, fever, chills, chest pain, trauma, back pain, abd pain, numbness or tingling sensation. Denies unusual activity.  Treatments tried: Cough medicine.  No other aggravating leaving factors.    ROS See HPI    Allergies:     No Known Allergies    PMSFS Hx:  Past Medical History:   Diagnosis Date    Anesthesia     'sensitive'    Arthritis     in his  knees    Cold     11/28    Diabetes (HCC)     \"borderline\"    Hepatitis C     High cholesterol     Renal disorder 1972    left [id number]    Unspecified disorder of thyroid     takes med     Past Surgical History:   Procedure Laterality Date    KNEE ARTHROPLASTY TOTAL Left 12/14/2015    Procedure: KNEE ARTHROPLASTY TOTAL & STAPLE REMOVAL;  Surgeon: Rose Mary Mclaughlin M.D.;  Location: SURGERY [facility];  Service:     KNEE ARTHROPLASTY TOTAL  1/11/2011    Performed by ROSE MARY MCLAUGHLIN at SURGERY [facility]    SEPTOTURBINOPLASTY  1/14/2010    Performed by MARLENE ROMAN at SURGERY SAME DAY [facility]    CLARKE BY LAPAROSCOPY  10/22/2009    Performed by LUBA APARICIO at SURGERY SAME DAY [facility]    OTHER  09/2009     liver biopsy    NEPHRECTOMY RADICAL  1972    left    OTHER ORTHOPEDIC SURGERY  [id number]    2 left knee and 3 right knee     Family History   Problem Relation Age of Onset    Stroke Unknown     Cancer Unknown      Social " "History     Tobacco Use    Smoking status: Never    Smokeless tobacco: Never   Substance Use Topics    Alcohol use: No       Problems:   Patient Active Problem List   Diagnosis    Coronary artery calcification of native artery    Hyperlipidemia    Gout    Localized primary osteoarthritis of left lower leg       Medications:   Current Outpatient Medications on File Prior to Visit   Medication Sig Dispense Refill    carboxymethylcellulose (REFRESH TEARS) 0.5 % Solution instill 1 drop into both eyes 4 times a day. as needed for dry eye      multivitamin (THERAPEUTIC MULTIVITAMIN) Tab Take 1 Tablet by mouth every day.      rosuvastatin (CRESTOR) 20 MG Tab Take 1 Tablet by mouth every day. 100 Tablet 4    SYNTHROID 75 MCG Tab       loratadine (CLARITIN) 10 MG Tab Take 10 mg by mouth every day.      Omega-3 Fatty Acids (FISH OIL) 1000 MG Cap capsule Take 1,000 mg by mouth 3 times a day, with meals.      Cholecalciferol (VITAMIN D PO) Take 2,000 Units by mouth.      aspirin (ASA) 81 MG Chew Tab chewable tablet Take 81 mg by mouth every day.       No current facility-administered medications on file prior to visit.        Objective:     /68 (BP Location: Right arm, Patient Position: Sitting, BP Cuff Size: Large adult)   Pulse 63   Temp 36.6 °C (97.9 °F)   Resp 14   Ht 1.651 m (5' 5\")   Wt 78 kg (172 lb)   SpO2 93%   BMI 28.62 kg/m²     Physical Exam  Nursing note reviewed.   Constitutional:       General: He is not in acute distress.     Appearance: Normal appearance. He is well-developed and well-groomed. He is not toxic-appearing.   HENT:      Head: Normocephalic and atraumatic.      Right Ear: External ear normal.      Left Ear: External ear normal.      Nose: Nose normal.   Eyes:      General:         Right eye: No discharge.         Left eye: No discharge.      Conjunctiva/sclera: Conjunctivae normal.      Pupils: Pupils are equal, round, and reactive to light.   Cardiovascular:      Rate and Rhythm: " Normal rate and regular rhythm.      Pulses: Normal pulses.      Heart sounds: Normal heart sounds.   Pulmonary:      Effort: Pulmonary effort is normal. No accessory muscle usage.      Breath sounds: Normal breath sounds and air entry.   Chest:       Musculoskeletal:      Cervical back: Neck supple.      Right lower leg: No edema.      Left lower leg: No edema.   Lymphadenopathy:      Cervical: No cervical adenopathy.      Upper Body:      Right upper body: No supraclavicular adenopathy.      Left upper body: No supraclavicular adenopathy.   Skin:     General: Skin is warm and dry.      Capillary Refill: Capillary refill takes less than 2 seconds.   Neurological:      Mental Status: He is alert and oriented to person, place, and time.   Psychiatric:         Mood and Affect: Mood normal.         Behavior: Behavior normal.       RADIOLOGY RESULTS   [id number] (WITH 1-VIEW CXR) RIGHT    Result Date: 4/15/2024  4/15/2024 10:58 AM HISTORY/REASON FOR EXAM:  Right rib pain.. TECHNIQUE/EXAM DESCRIPTION AND NUMBER OF VIEWS:  5 images of the right ribs and chest. COMPARISON: NONE FINDINGS: No fractures or acute bony changes are noted.  There is no evidence of a hemo or pneumothorax. There are surgical clips within the right upper quadrant.     No evidence of right rib fracture.         Xray: Reviewed and interpreted independently by me. I agree with the radiologist's findings.       Assessment /Associated Orders:      1. Right-sided chest wall pain        2. Rib pain on right side  [id number] (WITH 1-VIEW CXR) RIGHT      3. SOB (shortness of breath)  [id number] (WITH 1-VIEW CXR) RIGHT      4. Acute cough              Medical Decision Making:    Nestor is a very pleasant 73 y.o. male who is clinically stable at today's acute urgent care visit.  No acute distress noted.  VSS. Appropriate for outpatient care at this time.   Acute problem today with uncertain prognosis.  No deformity noted.  Normal  physical exam today.  He does have a dry intermittent cough he complains of the pain whenever he coughs.  His pain is reproducible with light palpation.  Chest x-ray and rib x-rays were normal today.  Low risk for PE, Acute cardiac event, pneumonia.     Ice/ heat packs prn pain  OTC  analgesic of choice (acetaminophen or NSAID) prn pain. Follow manufactures dosing and safety precautions.   OTC analgesic topical muscle/ joint cream prn pain. Dosage and directions per     Discussed Dx, management options (risks,benefits, and alternatives to planned treatment), natural progression and supportive care.  Expressed understanding and the treatment plan was agreed upon.   Questions were encouraged and answered   Return to urgent care prn if new or worsening sx or if there is no improvement in condition prn.    Educated in Red flags and indications to immediately call 911 or present to the Emergency Department.       Time I spent evaluating Nestor Borja JrAlea in urgent care today was 30  minutes. This time includes preparing for visit, reviewing any pertinent notes or test results, counseling/education, exam, obtaining HPI, interpretation of lab tests, medication management and documentation as indicated above.Time does not include separately billable procedures noted .       Please note that this dictation was created using voice recognition software. I have worked with consultants from the vendor as well as technical experts from [employer] to optimize the interface. I have made every reasonable attempt to correct obvious errors, but I expect that there are errors of grammar and possibly content that I did not discover before finalizing the note.  This note was electronically signed by provider

## 2024-08-14 ENCOUNTER — OFFICE VISIT (OUTPATIENT)
Dept: CARDIOLOGY | Facility: MEDICAL CENTER | Age: 74
End: 2024-08-14
Attending: INTERNAL MEDICINE
Payer: MEDICARE

## 2024-08-14 VITALS
SYSTOLIC BLOOD PRESSURE: 118 MMHG | HEART RATE: 64 BPM | DIASTOLIC BLOOD PRESSURE: 72 MMHG | HEIGHT: 65 IN | WEIGHT: 172.8 LBS | OXYGEN SATURATION: 93 % | RESPIRATION RATE: 16 BRPM | BODY MASS INDEX: 28.79 KG/M2

## 2024-08-14 DIAGNOSIS — E78.01 FAMILIAL HYPERCHOLESTEROLEMIA: ICD-10-CM

## 2024-08-14 DIAGNOSIS — E78.41 ELEVATED LIPOPROTEIN A LEVEL: ICD-10-CM

## 2024-08-14 PROCEDURE — G2211 COMPLEX E/M VISIT ADD ON: HCPCS | Performed by: INTERNAL MEDICINE

## 2024-08-14 PROCEDURE — 99214 OFFICE O/P EST MOD 30 MIN: CPT | Performed by: INTERNAL MEDICINE

## 2024-08-14 PROCEDURE — 99212 OFFICE O/P EST SF 10 MIN: CPT | Performed by: INTERNAL MEDICINE

## 2024-08-14 PROCEDURE — 3078F DIAST BP <80 MM HG: CPT | Performed by: INTERNAL MEDICINE

## 2024-08-14 PROCEDURE — 3074F SYST BP LT 130 MM HG: CPT | Performed by: INTERNAL MEDICINE

## 2024-08-14 RX ORDER — EZETIMIBE 10 MG/1
10 TABLET ORAL DAILY
Qty: 90 TABLET | Refills: 4 | Status: SHIPPED | OUTPATIENT
Start: 2024-08-14

## 2024-08-14 RX ORDER — ROSUVASTATIN CALCIUM 40 MG/1
40 TABLET, COATED ORAL DAILY
Qty: 100 TABLET | Refills: 4 | Status: SHIPPED | OUTPATIENT
Start: 2024-08-14

## 2024-08-14 ASSESSMENT — ENCOUNTER SYMPTOMS
HALLUCINATIONS: 0
PALPITATIONS: 0
FALLS: 0
SPEECH CHANGE: 0
EYE DISCHARGE: 0
PND: 0
SHORTNESS OF BREATH: 0
HEADACHES: 0
NAUSEA: 0
BLURRED VISION: 0
MYALGIAS: 0
CHILLS: 0
SENSORY CHANGE: 0
DIZZINESS: 0
WEIGHT LOSS: 0
BRUISES/BLEEDS EASILY: 0
EYE PAIN: 0
BLOOD IN STOOL: 0
FEVER: 0
ABDOMINAL PAIN: 0
LOSS OF CONSCIOUSNESS: 0
ORTHOPNEA: 0
COUGH: 0
CLAUDICATION: 0
VOMITING: 0
DOUBLE VISION: 0
DEPRESSION: 0

## 2024-08-14 ASSESSMENT — FIBROSIS 4 INDEX: FIB4 SCORE: 4.21

## 2024-08-14 NOTE — PROGRESS NOTES
"Chief Complaint   Patient presents with    Hyperlipidemia       Subjective     Nestor Borja Jr. is a 73 y.o. male who presents today for management of hyperlipidemia.    I have personally interpreted EKG today with patient, there is no evidence of acute coronary syndrome, no evidence of prior infarct, normal AL and QT interval, no significant conduction disease. Sinus rhythm.    Strong family hx of heart disease on his father's side.    I have independently interpreted and reviewed blood tests results with patient in clinic which shows LDL level of 89 up from 53, triglycerides level of 139, GFR of 60, K of 5. LPa of 55.    I have independently interpreted and reviewed echocardiogram's actual images with patient which showed normal left ventricular systolic function. No wall motion abnormality. No evidence of pulmonary hypertension. No significant valvular disease.        Past Medical History:   Diagnosis Date    Anesthesia     'sensitive'    Arthritis     in his  knees    Cold     11/28    Diabetes (HCC)     \"borderline\"    Hepatitis C     High cholesterol     Renal disorder 1972    left [id number]    Unspecified disorder of thyroid     takes med     Past Surgical History:   Procedure Laterality Date    KNEE ARTHROPLASTY TOTAL Left 12/14/2015    Procedure: KNEE ARTHROPLASTY TOTAL & STAPLE REMOVAL;  Surgeon: Rose Mary Mclaughlin M.D.;  Location: SURGERY [facility];  Service:     KNEE ARTHROPLASTY TOTAL  1/11/2011    Performed by ROSE MARY MCLAUGHLIN at SURGERY [facility]    SEPTOTURBINOPLASTY  1/14/2010    Performed by MARLENE ROMAN at SURGERY SAME DAY [facility]    CLARKE BY LAPAROSCOPY  10/22/2009    Performed by LUBA APARICIO at SURGERY SAME DAY [facility]    OTHER  09/2009     liver biopsy    NEPHRECTOMY RADICAL  1972    left    OTHER ORTHOPEDIC SURGERY  [id number]    2 left knee and 3 right knee     Family History   Problem Relation Age of Onset    Stroke Unknown     Cancer Unknown      Social " History     Socioeconomic History    Marital status:      Spouse name: Not on file    Number of children: Not on file    Years of education: Not on file    Highest education level: Not on file   Occupational History    Not on file   Tobacco Use    Smoking status: Never    Smokeless tobacco: Never   Substance and Sexual Activity    Alcohol use: No    Drug use: No    Sexual activity: Not on file   Other Topics Concern    Not on file   Social History Narrative    Not on file     Social Determinants of Health     Financial Resource Strain: Not on file   Food Insecurity: Not on file   Transportation Needs: Not on file   Physical Activity: Not on file   Stress: Not on file   Social Connections: Not on file   Intimate Partner Violence: Not on file   Housing Stability: Not on file     No Known Allergies  Outpatient Encounter Medications as of 8/14/2024   Medication Sig Dispense Refill    rosuvastatin (CRESTOR) 40 MG tablet Take 1 Tablet by mouth every day. 100 Tablet 4    ezetimibe (ZETIA) 10 MG Tab Take 1 Tablet by mouth every day. 90 Tablet 4    carboxymethylcellulose (REFRESH TEARS) 0.5 % Solution instill 1 drop into both eyes 4 times a day. as needed for dry eye      multivitamin (THERAPEUTIC MULTIVITAMIN) Tab Take 1 Tablet by mouth every day.      SYNTHROID 75 MCG Tab       loratadine (CLARITIN) 10 MG Tab Take 10 mg by mouth every day.      Omega-3 Fatty Acids (FISH OIL) 1000 MG Cap capsule Take 1,000 mg by mouth 3 times a day, with meals.      Cholecalciferol (VITAMIN D PO) Take 2,000 Units by mouth.      aspirin (ASA) 81 MG Chew Tab chewable tablet Take 81 mg by mouth every day.      [DISCONTINUED] rosuvastatin (CRESTOR) 20 MG Tab Take 1 Tablet by mouth every day. 100 Tablet 4     No facility-administered encounter medications on file as of 8/14/2024.     Review of Systems   Constitutional:  Negative for chills, fever, malaise/fatigue and weight loss.   HENT:  Negative for ear discharge, ear pain, hearing loss  "and nosebleeds.    Eyes:  Negative for blurred vision, double vision, pain and discharge.   Respiratory:  Negative for cough and shortness of breath.    Cardiovascular:  Negative for chest pain, palpitations, orthopnea, claudication, leg swelling and PND.   Gastrointestinal:  Negative for abdominal pain, blood in stool, melena, nausea and vomiting.   Genitourinary:  Negative for dysuria and hematuria.   Musculoskeletal:  Negative for falls, joint pain and myalgias.   Skin:  Negative for itching and rash.   Neurological:  Negative for dizziness, sensory change, speech change, loss of consciousness and headaches.   Endo/Heme/Allergies:  Negative for environmental allergies. Does not bruise/bleed easily.   Psychiatric/Behavioral:  Negative for depression, hallucinations and suicidal ideas.               Objective     /72 (BP Location: Left arm, Patient Position: Sitting, BP Cuff Size: Adult)   Pulse 64   Resp 16   Ht 1.651 m (5' 5\")   Wt 78.4 kg (172 lb 12.8 oz)   SpO2 93%   BMI 28.76 kg/m²     Physical Exam  Vitals and nursing note reviewed.   Constitutional:       General: He is not in acute distress.     Appearance: He is not diaphoretic.   HENT:      Head: Normocephalic and atraumatic.      Right Ear: External ear normal.      Left Ear: External ear normal.      Nose: No congestion or rhinorrhea.   Eyes:      General:         Right eye: No discharge.         Left eye: No discharge.   Neck:      Thyroid: No thyromegaly.      Vascular: No JVD.   Cardiovascular:      Rate and Rhythm: Normal rate and regular rhythm.      Pulses: Normal pulses.   Pulmonary:      Effort: No respiratory distress.   Abdominal:      General: There is no distension.      Tenderness: There is no abdominal tenderness.   Musculoskeletal:         General: No swelling or tenderness.      Right lower leg: No edema.      Left lower leg: No edema.   Skin:     General: Skin is warm and dry.   Neurological:      Mental Status: He is alert " and oriented to person, place, and time.      Cranial Nerves: No cranial nerve deficit.   Psychiatric:         Behavior: Behavior normal.                Assessment & Plan     1. Familial hypercholesterolemia  rosuvastatin (CRESTOR) 40 MG tablet    ezetimibe (ZETIA) 10 MG Tab      2. Elevated lipoprotein A level  rosuvastatin (CRESTOR) 40 MG tablet    ezetimibe (ZETIA) 10 MG Tab            Medical Decision Making: Today's Assessment/Status/Plan:   At this time patient is clinically stable in terms of his cardiac standpoint.    Due to elevated LPa, will increase Rosuvastatin to 40 mg daily and add Zetia 10 mg daily for LDL control with goal of less than 45.      Gordon Davila M.D.

## 2025-01-18 ENCOUNTER — TELEPHONE (OUTPATIENT)
Dept: URGENT CARE | Facility: CLINIC | Age: 75
End: 2025-01-18

## 2025-01-18 ENCOUNTER — OFFICE VISIT (OUTPATIENT)
Dept: URGENT CARE | Facility: CLINIC | Age: 75
End: 2025-01-18
Payer: MEDICARE

## 2025-01-18 VITALS
OXYGEN SATURATION: 95 % | BODY MASS INDEX: 29.16 KG/M2 | HEIGHT: 65 IN | DIASTOLIC BLOOD PRESSURE: 62 MMHG | HEART RATE: 62 BPM | SYSTOLIC BLOOD PRESSURE: 110 MMHG | WEIGHT: 175 LBS | TEMPERATURE: 98 F | RESPIRATION RATE: 16 BRPM

## 2025-01-18 DIAGNOSIS — J06.9 VIRAL UPPER RESPIRATORY ILLNESS: ICD-10-CM

## 2025-01-18 DIAGNOSIS — J02.9 PHARYNGITIS, UNSPECIFIED ETIOLOGY: ICD-10-CM

## 2025-01-18 LAB
FLUAV RNA SPEC QL NAA+PROBE: NEGATIVE
FLUBV RNA SPEC QL NAA+PROBE: NEGATIVE
RSV RNA SPEC QL NAA+PROBE: NEGATIVE
S PYO DNA SPEC NAA+PROBE: NOT DETECTED
SARS-COV-2 RNA RESP QL NAA+PROBE: NEGATIVE

## 2025-01-18 PROCEDURE — 99213 OFFICE O/P EST LOW 20 MIN: CPT | Performed by: PHYSICIAN ASSISTANT

## 2025-01-18 PROCEDURE — 3074F SYST BP LT 130 MM HG: CPT | Performed by: PHYSICIAN ASSISTANT

## 2025-01-18 PROCEDURE — 87651 STREP A DNA AMP PROBE: CPT | Performed by: PHYSICIAN ASSISTANT

## 2025-01-18 PROCEDURE — 0241U POCT CEPHEID COV-2, FLU A/B, RSV - PCR: CPT | Performed by: PHYSICIAN ASSISTANT

## 2025-01-18 PROCEDURE — 3078F DIAST BP <80 MM HG: CPT | Performed by: PHYSICIAN ASSISTANT

## 2025-01-18 RX ORDER — PREDNISONE 10 MG/1
20 TABLET ORAL DAILY
Qty: 10 TABLET | Refills: 0 | Status: SHIPPED | OUTPATIENT
Start: 2025-01-18 | End: 2025-01-23

## 2025-01-18 ASSESSMENT — ENCOUNTER SYMPTOMS
CONSTIPATION: 0
HEADACHES: 0
SINUS PAIN: 0
DIAPHORESIS: 0
ABDOMINAL PAIN: 0
EYE PAIN: 0
WHEEZING: 0
CHILLS: 0
DIARRHEA: 0
SORE THROAT: 1
VOMITING: 0
DIZZINESS: 0
FEVER: 0
SHORTNESS OF BREATH: 0
EYE REDNESS: 0
NAUSEA: 0
COUGH: 0
EYE DISCHARGE: 0

## 2025-01-18 ASSESSMENT — FIBROSIS 4 INDEX: FIB4 SCORE: 4.26

## 2025-01-18 NOTE — TELEPHONE ENCOUNTER
I attempted to call patient with Negative Covid result and to inform there was a medication sent to his pharmacy, there was no answer and the patients phone stated the service is out, I was unable to leave [facility].

## 2025-01-18 NOTE — PROGRESS NOTES
"  Subjective:     Nestor Borja Jr.  is a 74 y.o. male who presents for Sore Throat (With nasal congestion X 3 days now)       He presents today for sinus congestion and sore throat ongoing over the last 3 days.  Describes the sore throat as occurring in the morning but does improve by lunchtime.  He notes recent close sick contacts with family members who did have influenza.  He is requesting testing today as he has a young granddaughter at home and his wife is immunocompromise.  At this time he denies fever/chills/sweats, chest pain, shortness of breath, nausea/vomiting, abdominal pain, diarrhea.       Review of Systems   Constitutional:  Negative for chills, diaphoresis, fever and malaise/fatigue.   HENT:  Positive for congestion and sore throat. Negative for ear discharge and sinus pain.    Eyes:  Negative for pain, discharge and redness.   Respiratory:  Negative for cough, shortness of breath and wheezing.    Cardiovascular:  Negative for chest pain.   Gastrointestinal:  Negative for abdominal pain, constipation, diarrhea, nausea and vomiting.   Neurological:  Negative for dizziness and headaches.      No Known Allergies  Past Medical History:   Diagnosis Date    Anesthesia     'sensitive'    Arthritis     in his  knees    Cold     11/28    Diabetes (HCC)     \"borderline\"    Hepatitis C     High cholesterol     Renal disorder 1972    left [id number]    Unspecified disorder of thyroid     takes med        Objective:   /62 (BP Location: Left arm, Patient Position: Sitting, BP Cuff Size: Adult)   Pulse 62   Temp 36.7 °C (98 °F) (Temporal)   Resp 16   Ht 1.651 m (5' 5\")   Wt 79.4 kg (175 lb)   SpO2 95%   BMI 29.12 kg/m²   Physical Exam  Vitals and nursing note reviewed.   Constitutional:       General: He is not in acute distress.     Appearance: Normal appearance. He is not ill-appearing, toxic-appearing or diaphoretic.   HENT:      Head: Normocephalic.      Right Ear: Tympanic membrane, ear " canal and external ear normal. There is no impacted cerumen.      Left Ear: Tympanic membrane, ear canal and external ear normal. There is no impacted cerumen.      Nose: Congestion present. No rhinorrhea.      Mouth/Throat:      Mouth: Mucous membranes are moist.      Pharynx: No oropharyngeal exudate or posterior oropharyngeal erythema.      Comments: No tonsillar swelling, bilaterally.  No soft tissue swelling of the sublingual mucosa, no swelling of the soft or hard palate, no unilarteral oral pharynx swelling, no uvular deviation.  Eyes:      General:         Right eye: No discharge.         Left eye: No discharge.      Conjunctiva/sclera: Conjunctivae normal.   Cardiovascular:      Rate and Rhythm: Normal rate and regular rhythm.   Pulmonary:      Effort: Pulmonary effort is normal. No respiratory distress.      Breath sounds: Normal breath sounds. No stridor. No wheezing or rhonchi.   Musculoskeletal:      Cervical back: Neck supple.   Lymphadenopathy:      Cervical: No cervical adenopathy.   Neurological:      General: No focal deficit present.      Mental Status: He is alert and oriented to person, place, and time.   Psychiatric:         Mood and Affect: Mood normal.         Behavior: Behavior normal.         Thought Content: Thought content normal.         Judgment: Judgment normal.             Diagnostic testing:    [facility] Strep -negative, notified via phone call    Cephid COVID/Influenza/RSV -if, notified via phone call    Assessment/Plan:     Encounter Diagnoses   Name Primary?    Viral upper respiratory illness     Pharyngitis, unspecified etiology          Obtaining strep and viral panel testing today, this was negative.  Patient is likely suffering from a viral upper respiratory illness, no evidence to support antibiotic use at this time.  Will start the patient on prednisone for symptom support.  Encouraged over-the-counter medications for additional symptom relief.  Lung auscultation was normal  today, no rales, wheezes, rhonchi.  Vital signs were stable during today's office visit, patient was overall well-appearing. Continue to monitor symptoms and return to urgent care or follow-up with primary care provider if symptoms remain ongoing.  Follow-up in the emergency department if symptoms become severe, ER precautions discussed in office today.    See AVS Instructions below for written guidance provided to patient on after-visit management and care in addition to our verbal discussion during the visit.    Please note that this dictation was created using voice recognition software. I have attempted to correct all errors, but there may be sound-alike, spelling, grammar and possibly content errors that I did not discover before finalizing the note.    Leland Rodas PA-C

## 2025-03-18 ENCOUNTER — OFFICE VISIT (OUTPATIENT)
Dept: URGENT CARE | Facility: CLINIC | Age: 75
End: 2025-03-18
Payer: MEDICARE

## 2025-03-18 VITALS
RESPIRATION RATE: 12 BRPM | HEIGHT: 65 IN | BODY MASS INDEX: 28.69 KG/M2 | DIASTOLIC BLOOD PRESSURE: 54 MMHG | SYSTOLIC BLOOD PRESSURE: 118 MMHG | WEIGHT: 172.2 LBS | TEMPERATURE: 97.3 F | HEART RATE: 66 BPM | OXYGEN SATURATION: 96 %

## 2025-03-18 DIAGNOSIS — J02.9 PHARYNGITIS, UNSPECIFIED ETIOLOGY: ICD-10-CM

## 2025-03-18 DIAGNOSIS — J34.89 RHINORRHEA: ICD-10-CM

## 2025-03-18 LAB — S PYO DNA SPEC NAA+PROBE: NOT DETECTED

## 2025-03-18 PROCEDURE — 99213 OFFICE O/P EST LOW 20 MIN: CPT

## 2025-03-18 PROCEDURE — 87651 STREP A DNA AMP PROBE: CPT

## 2025-03-18 PROCEDURE — 3074F SYST BP LT 130 MM HG: CPT

## 2025-03-18 PROCEDURE — 3078F DIAST BP <80 MM HG: CPT

## 2025-03-18 ASSESSMENT — ENCOUNTER SYMPTOMS
SPUTUM PRODUCTION: 0
FEVER: 0
SHORTNESS OF BREATH: 0
SORE THROAT: 1
SINUS PAIN: 0
COUGH: 0
CHILLS: 0

## 2025-03-18 ASSESSMENT — FIBROSIS 4 INDEX: FIB4 SCORE: 4.26

## 2025-03-18 NOTE — PROGRESS NOTES
"Subjective:   Chief Complaint  Nestor Borja Jr. is a 74 y.o. male who presents for Pharyngitis (SORE THROAT X3 DAYS/ [facility])      History of Present Illness  Patient presents with complaints of sore throat for the past 3 days and then yesterday developed rhinorrhea.  He denies any sinus pressure, body aches, chills, shortness of breath.  He does state that he woke up this morning feeling sweaty but did not check his temperature and is unsure if he has had fevers.  He denies taking any over-the-counter medications to treat his symptoms.        Review of Systems  Review of Systems   Constitutional:  Negative for chills, fever and malaise/fatigue.   HENT:  Positive for sore throat. Negative for congestion, ear pain and sinus pain.    Respiratory:  Negative for cough, sputum production and shortness of breath.    Cardiovascular:  Negative for chest pain.       Past Medical History  Past Medical History:   Diagnosis Date    Anesthesia     'sensitive'    Arthritis     in his  knees    Cold     11/28    Diabetes (HCC)     \"borderline\"    Hepatitis C     High cholesterol     Renal disorder 1972    left [id number]    Unspecified disorder of thyroid     takes med       Family History  Family History   Problem Relation Age of Onset    Stroke Unknown     Cancer Unknown        Social History  Social History     Socioeconomic History    Marital status:    Tobacco Use    Smoking status: Never    Smokeless tobacco: Never   Substance and Sexual Activity    Alcohol use: No    Drug use: No       Surgical History  Past Surgical History:   Procedure Laterality Date    KNEE ARTHROPLASTY TOTAL Left 12/14/2015    Procedure: KNEE ARTHROPLASTY TOTAL & STAPLE REMOVAL;  Surgeon: Frank Mclaughlin M.D.;  Location: [facility];  Service:     KNEE ARTHROPLASTY TOTAL  1/11/2011    Performed by FRANK MCLAUGHLIN at [facility]    SEPTOPLASTY, NOSE, WITH TURBINOPLASTY  1/14/2010    Performed by MARLENE ROMAN at " "SURGERY SAME DAY [facility]    CLARKE BY LAPAROSCOPY  10/22/2009    Performed by LUBA APARICIO at SURGERY SAME DAY [facility]    OTHER  09/2009     liver biopsy    NEPHRECTOMY RADICAL  1972    left    OTHER ORTHOPEDIC SURGERY  [id number]    2 left knee and 3 right knee       Current Medications  Home Medications       Reviewed by MARJORIE Zavala (Nurse Practitioner) on 03/18/25 at 1148  Med List Status: <None>     Medication Last Dose Status   aspirin (ASA) 81 MG Chew Tab chewable tablet Taking Active   Cholecalciferol (VITAMIN D PO) Taking Active   ezetimibe (ZETIA) 10 MG Tab Not Taking Active   loratadine (CLARITIN) 10 MG Tab Taking Active   multivitamin (THERAPEUTIC MULTIVITAMIN) Tab Taking Active   Omega-3 Fatty Acids (FISH OIL) 1000 MG Cap capsule Taking Active   rosuvastatin (CRESTOR) 40 MG tablet Taking Active   SYNTHROID 75 MCG Tab Taking Active                    Allergies  No Known Allergies       Objective:     /54 (BP Location: Left arm, Patient Position: Sitting)   Pulse 66   Temp 36.3 °C (97.3 °F) (Temporal)   Resp 12   Ht 1.651 m (5' 5\")   Wt 78.1 kg (172 lb 3.2 oz)   SpO2 96%     Physical Exam  Constitutional:       Appearance: Normal appearance.   HENT:      Head: Normocephalic and atraumatic.      Right Ear: Tympanic membrane, ear canal and external ear normal.      Left Ear: Tympanic membrane, ear canal and external ear normal.      Nose: Rhinorrhea present.      Mouth/Throat:      Mouth: Mucous membranes are moist.      Pharynx: Posterior oropharyngeal erythema present.   Eyes:      Conjunctiva/sclera: Conjunctivae normal.   Cardiovascular:      Rate and Rhythm: Normal rate and regular rhythm.      Pulses: Normal pulses.      Heart sounds: Normal heart sounds.   Pulmonary:      Effort: Pulmonary effort is normal.      Breath sounds: Normal breath sounds.   Neurological:      Mental Status: He is alert.       Results for orders placed or performed in visit on 03/18/25 "   POCT GROUP A STREP, PCR    Collection Time: 03/18/25 12:33 PM   Result Value Ref Range    POC Group A Strep, PCR Not Detected Not Detected, Invalid         Assessment/Plan:     Diagnosis  1. Pharyngitis, unspecified etiology  - POCT GROUP A STREP, PCR    2. Rhinorrhea        MDM/Plan/Discussion  Get plenty of rest.  Drink lots of fluids (water, juice, or broth) to stay hydrated. This helps replace any fluids lost if you have a runny nose or sweating from a fever. Warm tea or soup can help soothe a sore throat.  Use a cool mist humidifier to add moisture to the air, if it helps.  Use saline nose drops or spray to relieve stuffiness.  Avoid smoking, and stay away from places where people are smoking.   You may consider intranasal steroids such as fluticasone (brand name Flonase), (other options include Nasonex, Rhinocort, Nasacort) daily; continue for at least 2-3 weeks. Any generic should work as well but may cause slightly more nasal irritation. Please take according to package directions.  This steroid will help reduce inflammation of your sinuses.  This is the number one medication to help with seasonal allergies and treat nasal inflammation.          Differential diagnosis, natural history, supportive care, and indications for immediate follow-up discussed.    Advised the patient to follow-up with the primary care physician for recheck, reevaluation, and consideration of further management.    Advised the patient to return or seek immediate medical attention if symptoms worsen or new symptoms develop    Please note that this dictation was created using voice recognition software. I have made a reasonable attempt to correct obvious errors, but I expect that there are errors of grammar and possibly content that I did not discover before finalizing the note.    This note was electronically signed by MARJORIE Zavala

## 2025-08-29 DIAGNOSIS — E78.01 FAMILIAL HYPERCHOLESTEROLEMIA: ICD-10-CM

## 2025-08-29 DIAGNOSIS — E78.41 ELEVATED LIPOPROTEIN A LEVEL: ICD-10-CM

## 2025-08-29 RX ORDER — ROSUVASTATIN CALCIUM 40 MG/1
TABLET, COATED ORAL
Qty: 90 TABLET | Refills: 0 | OUTPATIENT
Start: 2025-08-29

## 2025-08-29 RX ORDER — EZETIMIBE 10 MG/1
TABLET ORAL
Qty: 90 TABLET | Refills: 0 | OUTPATIENT
Start: 2025-08-29